# Patient Record
Sex: MALE | Race: WHITE
[De-identification: names, ages, dates, MRNs, and addresses within clinical notes are randomized per-mention and may not be internally consistent; named-entity substitution may affect disease eponyms.]

---

## 2019-01-09 ENCOUNTER — HOSPITAL ENCOUNTER (EMERGENCY)
Dept: HOSPITAL 7 - FB.ED | Age: 74
End: 2019-01-09
Payer: MEDICARE

## 2019-01-09 DIAGNOSIS — K25.0: Primary | ICD-10-CM

## 2019-01-09 PROCEDURE — C9113 INJ PANTOPRAZOLE SODIUM, VIA: HCPCS

## 2019-01-09 PROCEDURE — P9016 RBC LEUKOCYTES REDUCED: HCPCS

## 2019-01-09 RX ADMIN — OCTREOTIDE ACETATE ONE MCG: 100 INJECTION, SOLUTION INTRAVENOUS; SUBCUTANEOUS at 01:35

## 2019-01-09 RX ADMIN — OCTREOTIDE ACETATE SCH MLS/HR: 100 INJECTION, SOLUTION INTRAVENOUS; SUBCUTANEOUS at 01:44

## 2019-01-09 NOTE — EDM.PDOC
ED HPI GENERAL MEDICAL PROBLEM





- General


Stated Complaint: VOMIT BLOOD


Time Seen by Provider: 19 01:14


Source of Information: Reports: Patient, Other (Paramedics, fiance,)


History Limitations: Reports: Other (Patient's is weak, can answer questions, 

and is a poor historian because he feels so weak.Oriented x 3.)





- History of Present Illness


INITIAL COMMENTS - FREE TEXT/NARRATIVE: 





This 73-year-old  alcoholic, [facility] resident,was just discharge from 

[facility] 1/3/19 and per his fiance was "noted to have 5 ulcers in his stomach 

for which one was banded", 1/2 hr ago started vomiting blood.His fianc drove 

him to the hospital and states "he vomited 2 cups of blood at the [facility]:" 1

) a coffee ground emesis (200-240 ml), and 2) a second cup (200-240 ml of blood 

bright red blood. He is a poor historian because he is feeling so weak and has 

a soft voice. He looked ashen, was not diaphoretic, and did not have scleral 

icterus.  Just as he was seen in the ED and coming down the hallway to  his 

exam room he vomited an additional 700 mL of red blood into the emesis bag 

which had been given to him at the [facility]. This gave us guestimate of at 

least 4816-9418 mL blood loss.





Because of extreme weakness and obesity, 246 pounds, it was difficult to get 

him out of the car, into the wheelchair, and from the wheelchair onto the 

gurney.





He had a moderate amount of bright red blood soiling of his shirt.


 


Stat 2 uints of O+ blood ordered verbally to lab staff which was standing by 

with the rapid response team, octreotide 50 micro push with 50 micron per hour 

started, a flush of 1000ml Lactated ringers with simultaneous 1000 ml NS 

started with pressure bags attached. 





Simultaneously, Elevation of the head improved his O2 sats because of his 

difficulty breathing secondary to his large obese abdomen. .





Concurrently, at 0130, helicopter was dispatched by first physician staff 

[facility] as I had spoken to the intensivist, Dr.Khan Harrison, regarding transfer 

to  [facility]. His first blood pressure 158/128 heart rate 76, respirations 

32, oxygen saturation 92.





EK, L atrial premature contractions, prolonged ND interval, left 

anterior hemiblock noted heart rate 64 and no sign of ischemia or STEMI noted


 


Saini placed with minimal output noted.  





0153: BP 54/52 , HR 79, O2 SAT 98, RR 33.





1st unt packed RBC's hung 01:54 with pressure bag, and 80 mg IV Protonix given, 

as the flight transport crew arrived 0154





0157: 89/58, 79, 36





20:06 the flight crew arrived.





20:07 A they were changing the hospital monitor leads to the transport leads, 

he vomited an estimated liter of blood onto the floor.  





2017  pupils became fixed and patient became unresponsive. CPR started.





Efforts by the flight crew  to place an endotracheal tube were met with 

difficulty. The vocal cords were not visualized, as there was such extensive 

recurrent filling of of oropharyngeal with blood that the suction could not 

remove fast enough in order to visualize the cords. And an estimated additional 

1 liter of blood poured out of his mouth on to the floor.





At 0227 his fianc, who was supportive and had present for the entire ED 

encounter and who is his power of , said "I think we should stop". 





Patient became DNR/DNI and resuscitation efforts were discontinued. 





0229 Pt was pronouced dead.





CAUSE OF  DEATH UPPER GI BLEED SECONDARY TO EGD 1/3/19 DOCUMENTED  ESOPHAGEAL 

VARICES AND GASTRIC ULCERS





ED ROS GENERAL





- Review of Systems


Review Of Systems: ROS reveals no pertinent complaints other than HPI.





ED EXAM, GENERAL





- Physical Exam


Exam: See Below


Free Text/Narrative:: 





SEE HPI





Course





- Vital Signs


Last Recorded V/S: 


 Last Vital Signs











Temp      


 


Pulse  78   19 01:53


 


Resp  34 H  19 01:53


 


BP  52/36 L  19 02:01


 


Pulse Ox  85 L  19 01:53














- Orders/Labs/Meds


Orders: 


 Active Orders 24 hr











 Category Date Time Status


 


 EKG Documentation Completion [RC] ASDIRECTED Care  19 01:30 Active


 


 Saini Catheter Insertion [Insert Urinary Catheter] [OM. Care  19 01:30 

Ordered





 PC] Q24H   


 


 Oxygen Therapy, ED [RC] ASDIRECTED Care  19 01:14 Active


 


 Urinary Catheter Assessment [RC] QSHIFT Care  19 01:26 Active


 


 PATIENT RETYPE [BBK] Stat Lab  19 01:20 Results


 


 RED BLOOD CELLS LP [BBK] Stat Lab  19 01:20 Results


 


 TYPE AND SCREEN [BBK] Stat Lab  19 01:20 Results


 


 Transfuse Red Blood Cells [COMM] Urgent Oth  19 01:27 Ordered


 


 EKG 12 Lead [EK] Routine Ther  19 01:27 Ordered











Labs: 


 Laboratory Tests











  19 Range/Units





  01:20 01:50 01:50 


 


WBC   12.4 H   (4.5-12.0)  X10-3/uL


 


RBC   2.99 L   (4.30-5.75)  x10(6)uL


 


Hgb   9.8 L   (11.5-15.5)  g/dL


 


POC Hgb    Cancelled  


 


Hct   29.1 L   (30.0-51.3)  %


 


POC Hct    Cancelled  


 


MCV   97.3 H   (80-96)  fL


 


MCH   32.7   (27.7-33.6)  pg


 


MCHC   33.6   (32.2-35.4)  g/dL


 


RDW   13.7   (11.5-15.5)  %


 


Plt Count   94 L   (125-369)  X10(3)uL


 


MPV   8.4   (7.4-10.4)  fL


 


Neut % (Auto)   62.4   (46-82)  %


 


Lymph % (Auto)   28.3   (13-37)  %


 


Mono % (Auto)   5.9   (4-12)  %


 


Eos % (Auto)   3   (1.0-5.0)  %


 


Baso % (Auto)   1   (0-2)  %


 


Neut # (Auto)   7.7   (1.6-8.3)  #


 


Lymph # (Auto)   3.5   (0.6-5.0)  #


 


Mono # (Auto)   0.7   (0.0-1.3)  #


 


Eos # (Auto)   0.4   (0.0-0.8)  #


 


Baso # (Auto)   0.1   (0.0-0.2)  #


 


POC VBG pH    Cancelled  


 


POC VBG pCO2    Cancelled  


 


POC VBG HCO3    Cancelled  


 


POC VBG Base Excess    Cancelled  


 


POC Sodium    Cancelled  


 


POC Potassium    Cancelled  


 


POC Chloride    Cancelled  


 


POC Total CO2    Cancelled  


 


POC Anion Gap    Cancelled  


 


POC BUN    Cancelled  


 


POC Creatinine     (0.6-1.3)  mg/dL


 


POC Glucose    Cancelled  


 


Blood Type  O POSITIVE    


 


Gel Antibody Screen  Negative    


 


Crossmatch  See Detail    














  19 Range/Units





  02:59 


 


WBC   (4.5-12.0)  X10-3/uL


 


RBC   (4.30-5.75)  x10(6)uL


 


Hgb   (11.5-15.5)  g/dL


 


POC Hgb   


 


Hct   (30.0-51.3)  %


 


POC Hct   


 


MCV   (80-96)  fL


 


MCH   (27.7-33.6)  pg


 


MCHC   (32.2-35.4)  g/dL


 


RDW   (11.5-15.5)  %


 


Plt Count   (125-369)  X10(3)uL


 


MPV   (7.4-10.4)  fL


 


Neut % (Auto)   (46-82)  %


 


Lymph % (Auto)   (13-37)  %


 


Mono % (Auto)   (4-12)  %


 


Eos % (Auto)   (1.0-5.0)  %


 


Baso % (Auto)   (0-2)  %


 


Neut # (Auto)   (1.6-8.3)  #


 


Lymph # (Auto)   (0.6-5.0)  #


 


Mono # (Auto)   (0.0-1.3)  #


 


Eos # (Auto)   (0.0-0.8)  #


 


Baso # (Auto)   (0.0-0.2)  #


 


POC VBG pH   


 


POC VBG pCO2   


 


POC VBG HCO3   


 


POC VBG Base Excess   


 


POC Sodium  138  


 


POC Potassium  5.1  


 


POC Chloride  108  


 


POC Total CO2  13.0 L*  


 


POC Anion Gap   


 


POC BUN  28 H  


 


POC Creatinine  1.2  (0.6-1.3)  mg/dL


 


POC Glucose  199 H  


 


Blood Type   


 


Gel Antibody Screen   


 


Crossmatch   











Meds: 


Medications














Discontinued Medications














Generic Name Dose Route Start Last Admin





  Trade Name Freq  PRN Reason Stop Dose Admin


 


Lactated Ringer's  1,000 mls @ 999 mls/hr  19 01:30  





  Ringers, Lactated  IV   





  ASDIRECTED ALICIA   





     





     





     





     


 


Sodium Chloride  1,000 mls @ 999 mls/hr  19 01:30  





  Normal Saline  IV   





  ASDIRECTED ALICIA   





     





     





     





     


 


Sodium Chloride  250 mls @ 100 mls/hr  19 01:30  





  Normal Saline  IV   





  ASDIRECTED ALICIA   





     





     





     





     


 


Octreotide Acetate 250 mcg/  252.5 mls @ 50.5 mls/hr  19 01:45  





  Sodium Chloride  IV   





  Q10H ALICIA   





     





     





     





  50 MCG/HR   


 


Octreotide Acetate  50 mcg  19 01:30  





  Sandostatin  IVPUSH  19 01:31  





  ONETIME ONE   





     





     





     





     


 


Pantoprazole Sodium  Confirm  19 01:58  





  Protonix Iv***  Administered  19 01:59  





  Dose   





  80 mg   





  .ROUTE   





  .STK-MED ONE   





     





     





     





     














Departure





- Departure


Time of Disposition: 01:30 ([facility]


Disposition:  20


Preliminary Cause of Death *Q: Other_Special Instruction (Exsanguination 

secondary to massive duodenal ulcer and esophageal variceal bleed)


Condition: Critical


Clinical Impression: 


 Death, Bleeding, Esophageal ulcer with bleeding, Gastrointestinal hemorrhage 

associated with chronic gastritis, Bleeding gastric varices, Bleeding gastric 

erosion





Gastric ulcer


Qualifiers:


 Gastric ulcer chronicity: acute Gastric ulcer complication status: with 

hemorrhage Qualified Code(s): K25.0 - Acute gastric ulcer with hemorrhage








- Discharge Information


*PRESCRIPTION DRUG MONITORING PROGRAM REVIEWED*: Not Applicable


*COPY OF PRESCRIPTION DRUG MONITORING REPORT IN PATIENT ROBER: Not Applicable


Referrals: 


Lucas Corona MD [Primary Care Provider] - 


Forms:  ED Department Discharge





- My Orders


Last 24 Hours: 


My Active Orders





19 01:14


Oxygen Therapy, ED [] ASDIRECTED 





19 01:20


PATIENT RETYPE [BBK] Stat 


RED BLOOD CELLS LP [BBK] Stat 


TYPE AND SCREEN [BBK] Stat 





19 01:26


Urinary Catheter Assessment [] QSHIFT 





19 01:27


Transfuse Red Blood Cells [COMM] Urgent 


EKG 12 Lead [EK] Routine 





19 01:30


EKG Documentation Completion [] ASDIRECTED 


Saini Catheter Insertion [Insert Urinary Catheter] [OM.PC] Q24H 














- Assessment/Plan


Last 24 Hours: 


My Active Orders





19 01:14


Oxygen Therapy, ED [] ASDIRECTED 





19 01:20


PATIENT RETYPE [BBK] Stat 


RED BLOOD CELLS LP [BBK] Stat 


TYPE AND SCREEN [BBK] Stat 





19 01:26


Urinary Catheter Assessment [] QSHIFT 





19 01:27


Transfuse Red Blood Cells [COMM] Urgent 


EKG 12 Lead [EK] Routine 





19 01:30


EKG Documentation Completion [RC] ASDIRECTED 


Saini Catheter Insertion [Insert Urinary Catheter] [OM.PC] Q24H